# Patient Record
Sex: FEMALE | Race: WHITE
[De-identification: names, ages, dates, MRNs, and addresses within clinical notes are randomized per-mention and may not be internally consistent; named-entity substitution may affect disease eponyms.]

---

## 2018-02-16 ENCOUNTER — HOSPITAL ENCOUNTER (OUTPATIENT)
Dept: HOSPITAL 53 - M SDC | Age: 28
Discharge: HOME | End: 2018-02-16
Attending: OBSTETRICS & GYNECOLOGY
Payer: COMMERCIAL

## 2018-02-16 DIAGNOSIS — Z30.2: Primary | ICD-10-CM

## 2018-02-16 LAB
CONTROL LINE HCG: (no result)
HCG, SERUM QUALITATIVE: NEGATIVE
HEMATOCRIT: 39.1 % (ref 36–47)
HEMOGLOBIN: 12.4 G/DL (ref 12–16)
MEAN CORPUSCULAR HEMOGLOBIN: 27.7 PG (ref 27–33)
MEAN CORPUSCULAR HGB CONC: 31.7 G/DL (ref 32–36.5)
MEAN CORPUSCULAR VOLUME: 87.5 FL (ref 80–96)
NRBC BLD AUTO-RTO: 0 % (ref 0–0)
PLATELET COUNT, AUTOMATED: 342 10^3/UL (ref 150–450)
RED BLOOD COUNT: 4.47 10^6/UL (ref 4–5.4)
RED CELL DISTRIBUTION WIDTH: 13.3 % (ref 11.5–14.5)
WHITE BLOOD COUNT: 6 10^3/UL (ref 4–10)

## 2018-02-16 PROCEDURE — 58671 LAPAROSCOPY TUBAL BLOCK: CPT

## 2018-02-16 RX ADMIN — FENTANYL CITRATE 1 MCG: 50 INJECTION, SOLUTION INTRAMUSCULAR; INTRAVENOUS at 10:40

## 2018-02-16 RX ADMIN — FENTANYL CITRATE 1 MCG: 50 INJECTION, SOLUTION INTRAMUSCULAR; INTRAVENOUS at 10:30

## 2018-02-16 RX ADMIN — BUPIVACAINE HYDROCHLORIDE 1 ML: 2.5 INJECTION, SOLUTION EPIDURAL; INFILTRATION; INTRACAUDAL at 09:25

## 2018-02-16 RX ADMIN — FENTANYL CITRATE 1 MCG: 50 INJECTION, SOLUTION INTRAMUSCULAR; INTRAVENOUS at 10:45

## 2018-02-16 RX ADMIN — FENTANYL CITRATE 1 MCG: 50 INJECTION, SOLUTION INTRAMUSCULAR; INTRAVENOUS at 10:35

## 2018-02-16 RX ADMIN — HYDROCODONE BITARTRATE AND ACETAMINOPHEN 1 TAB: 5; 325 TABLET ORAL at 10:15

## 2018-02-16 RX ADMIN — SODIUM CHLORIDE, POTASSIUM CHLORIDE, SODIUM LACTATE AND CALCIUM CHLORIDE 1 MLS/HR: 600; 310; 30; 20 INJECTION, SOLUTION INTRAVENOUS at 08:20

## 2018-02-16 RX ADMIN — ONDANSETRON 1 MG: 2 INJECTION INTRAMUSCULAR; INTRAVENOUS at 10:15

## 2018-02-16 RX ADMIN — SODIUM CHLORIDE, POTASSIUM CHLORIDE, SODIUM LACTATE AND CALCIUM CHLORIDE 1 MLS/HR: 600; 310; 30; 20 INJECTION, SOLUTION INTRAVENOUS at 09:51

## 2018-02-16 RX ADMIN — KETOROLAC TROMETHAMINE 1 MG: 30 INJECTION, SOLUTION INTRAMUSCULAR at 10:15

## 2018-04-24 ENCOUNTER — HOSPITAL ENCOUNTER (EMERGENCY)
Dept: HOSPITAL 53 - M ED | Age: 28
Discharge: LEFT BEFORE DISCHARGE | End: 2018-04-24
Payer: MEDICAID

## 2018-04-24 DIAGNOSIS — Z53.21: Primary | ICD-10-CM

## 2018-09-17 ENCOUNTER — HOSPITAL ENCOUNTER (EMERGENCY)
Dept: HOSPITAL 53 - M ED | Age: 28
Discharge: HOME | End: 2018-09-17
Payer: COMMERCIAL

## 2018-09-17 DIAGNOSIS — Z91.040: ICD-10-CM

## 2018-09-17 DIAGNOSIS — F17.210: ICD-10-CM

## 2018-09-17 DIAGNOSIS — K04.7: Primary | ICD-10-CM

## 2018-09-17 DIAGNOSIS — F33.9: ICD-10-CM

## 2018-09-17 PROCEDURE — 99282 EMERGENCY DEPT VISIT SF MDM: CPT

## 2018-09-17 RX ADMIN — LIDOCAINE HYDROCHLORIDE 1 ML: 20 SOLUTION ORAL; TOPICAL at 11:23

## 2019-03-14 ENCOUNTER — HOSPITAL ENCOUNTER (OUTPATIENT)
Dept: HOSPITAL 53 - M LAB REF | Age: 29
End: 2019-03-14
Attending: OBSTETRICS & GYNECOLOGY
Payer: COMMERCIAL

## 2019-03-14 ENCOUNTER — HOSPITAL ENCOUNTER (OUTPATIENT)
Dept: HOSPITAL 53 - M SMT | Age: 29
End: 2019-03-14
Attending: OBSTETRICS & GYNECOLOGY
Payer: COMMERCIAL

## 2019-03-14 DIAGNOSIS — N92.0: Primary | ICD-10-CM

## 2019-03-14 DIAGNOSIS — R87.610: ICD-10-CM

## 2019-03-14 DIAGNOSIS — Z12.4: Primary | ICD-10-CM

## 2019-03-14 LAB
HCT VFR BLD AUTO: 39 % (ref 36–47)
HGB BLD-MCNC: 12.8 G/DL (ref 12–15.5)
MCH RBC QN AUTO: 29.8 PG (ref 27–33)
MCHC RBC AUTO-ENTMCNC: 32.8 G/DL (ref 32–36.5)
MCV RBC AUTO: 90.9 FL (ref 80–96)
PLATELET # BLD AUTO: 331 10^3/UL (ref 150–450)
RBC # BLD AUTO: 4.29 10^6/UL (ref 4–5.4)
T4 FREE SERPL-MCNC: 1.01 NG/DL (ref 0.76–1.46)
TSH SERPL DL<=0.005 MIU/L-ACNC: 0.63 UIU/ML (ref 0.36–3.74)
WBC # BLD AUTO: 5.8 10^3/UL (ref 4–10)

## 2019-03-19 ENCOUNTER — HOSPITAL ENCOUNTER (OUTPATIENT)
Dept: HOSPITAL 53 - M RAD | Age: 29
End: 2019-03-19
Attending: OBSTETRICS & GYNECOLOGY
Payer: COMMERCIAL

## 2019-03-19 DIAGNOSIS — N92.0: Primary | ICD-10-CM

## 2019-03-19 LAB — HPV LOW VOL RFLX: (no result)

## 2019-03-20 NOTE — REP
Clinical:  Abnormal menstruation.

 

Technique:  Transabdominal pelvic ultrasound.

 

Findings:

 

Bladder is unremarkable and measures 9.9 x 9.4 x 6.3 cm .

 

Normal anteverted uterus measures 10.0 x 3.5 x 5.4 cm .  The endometrial complex

measures as 2.7 mm thickness.  No discrete uterine or endometrial abnormalities

are appreciated.

 

Bilateral ovaries are normal in appearance.  Right ovary measures 3.7 x 1.8 x 2.9

cm.  Left ovary measures 3.3 x 1.8 x 1.9 cm.

 

No pelvic fluid or adnexal mass lesion .

 

Impression:

1.  normal pelvic ultrasound.

 

 

Electronically Signed by

Marck Royal MD 03/20/2019 05:38 A

## 2019-03-29 ENCOUNTER — HOSPITAL ENCOUNTER (OUTPATIENT)
Dept: HOSPITAL 53 - M SDC | Age: 29
LOS: 1 days | Discharge: HOME | End: 2019-03-30
Attending: OBSTETRICS & GYNECOLOGY
Payer: COMMERCIAL

## 2019-03-29 VITALS — SYSTOLIC BLOOD PRESSURE: 122 MMHG | DIASTOLIC BLOOD PRESSURE: 62 MMHG

## 2019-03-29 VITALS — WEIGHT: 118 LBS | HEIGHT: 65 IN | BODY MASS INDEX: 19.66 KG/M2

## 2019-03-29 VITALS — SYSTOLIC BLOOD PRESSURE: 106 MMHG | DIASTOLIC BLOOD PRESSURE: 55 MMHG

## 2019-03-29 VITALS — SYSTOLIC BLOOD PRESSURE: 116 MMHG | DIASTOLIC BLOOD PRESSURE: 75 MMHG

## 2019-03-29 VITALS — SYSTOLIC BLOOD PRESSURE: 108 MMHG | DIASTOLIC BLOOD PRESSURE: 58 MMHG

## 2019-03-29 VITALS — DIASTOLIC BLOOD PRESSURE: 55 MMHG | SYSTOLIC BLOOD PRESSURE: 106 MMHG

## 2019-03-29 VITALS — SYSTOLIC BLOOD PRESSURE: 106 MMHG | DIASTOLIC BLOOD PRESSURE: 51 MMHG

## 2019-03-29 VITALS — DIASTOLIC BLOOD PRESSURE: 51 MMHG | SYSTOLIC BLOOD PRESSURE: 102 MMHG

## 2019-03-29 VITALS — DIASTOLIC BLOOD PRESSURE: 50 MMHG | SYSTOLIC BLOOD PRESSURE: 100 MMHG

## 2019-03-29 DIAGNOSIS — F17.210: ICD-10-CM

## 2019-03-29 DIAGNOSIS — N94.6: ICD-10-CM

## 2019-03-29 DIAGNOSIS — F32.9: ICD-10-CM

## 2019-03-29 DIAGNOSIS — D64.9: ICD-10-CM

## 2019-03-29 DIAGNOSIS — N92.6: Primary | ICD-10-CM

## 2019-03-29 DIAGNOSIS — Z91.040: ICD-10-CM

## 2019-03-29 DIAGNOSIS — N72: ICD-10-CM

## 2019-03-29 DIAGNOSIS — F41.9: ICD-10-CM

## 2019-03-29 LAB
B-HCG SERPL QL: NEGATIVE
HCT VFR BLD AUTO: 37.5 % (ref 36–47)
HGB BLD-MCNC: 12.3 G/DL (ref 12–15.5)
MCH RBC QN AUTO: 29.6 PG (ref 27–33)
MCHC RBC AUTO-ENTMCNC: 32.8 G/DL (ref 32–36.5)
MCV RBC AUTO: 90.4 FL (ref 80–96)
PLATELET # BLD AUTO: 276 10^3/UL (ref 150–450)
RBC # BLD AUTO: 4.15 10^6/UL (ref 4–5.4)
WBC # BLD AUTO: 8.5 10^3/UL (ref 4–10)

## 2019-03-29 PROCEDURE — 86900 BLOOD TYPING SEROLOGIC ABO: CPT

## 2019-03-29 PROCEDURE — 88307 TISSUE EXAM BY PATHOLOGIST: CPT

## 2019-03-29 PROCEDURE — 84703 CHORIONIC GONADOTROPIN ASSAY: CPT

## 2019-03-29 PROCEDURE — 86850 RBC ANTIBODY SCREEN: CPT

## 2019-03-29 PROCEDURE — 36415 COLL VENOUS BLD VENIPUNCTURE: CPT

## 2019-03-29 PROCEDURE — 85027 COMPLETE CBC AUTOMATED: CPT

## 2019-03-29 PROCEDURE — 58571 TLH W/T/O 250 G OR LESS: CPT

## 2019-03-29 PROCEDURE — 86901 BLOOD TYPING SEROLOGIC RH(D): CPT

## 2019-03-29 RX ADMIN — FENTANYL CITRATE PRN MCG: 50 INJECTION, SOLUTION INTRAMUSCULAR; INTRAVENOUS at 12:12

## 2019-03-29 RX ADMIN — KETOROLAC TROMETHAMINE SCH MG: 30 INJECTION, SOLUTION INTRAMUSCULAR at 22:10

## 2019-03-29 RX ADMIN — KETOROLAC TROMETHAMINE SCH MG: 30 INJECTION, SOLUTION INTRAMUSCULAR at 17:07

## 2019-03-29 RX ADMIN — FENTANYL CITRATE PRN MCG: 50 INJECTION, SOLUTION INTRAMUSCULAR; INTRAVENOUS at 11:55

## 2019-03-29 RX ADMIN — FENTANYL CITRATE PRN MCG: 50 INJECTION, SOLUTION INTRAMUSCULAR; INTRAVENOUS at 12:18

## 2019-03-29 RX ADMIN — FENTANYL CITRATE PRN MCG: 50 INJECTION, SOLUTION INTRAMUSCULAR; INTRAVENOUS at 12:06

## 2019-03-29 NOTE — RO
DATE OF PROCEDURE:  03/29/2019

 

PREOPERATIVE DIAGNOSIS:  Abnormal uterine bleeding.

 

POSTOPERATIVE DIAGNOSIS:  Abnormal uterine bleeding.

 

PROCEDURE PERFORMED:

1.  Robotic-assisted laparoscopic hysterectomy.

2.  Bilateral salpingectomy.

3.  Cystoscopy.

 

SURGEON:  Madonna Cortes MD

 

ASSISTANT:  Jackie Kilgore NP

 

ANESTHESIA:  General endotracheal anesthesia.

 

ESTIMATED BLOOD LOSS:  100 mL.

 

IV FLUIDS:  500 mL of lactated Ringer solution.

 

URINE OUTPUT:  100 mL.

 

SPECIMENS:  Uterus, cervix, bilateral fallopian tubes with bilateral Filshie

clips.

 

PREOPERATIVE ANTIBIOTICS:  2 grams of Ancef.

 

INFECTION CLASSIFICATION:  II.

 

DESCRIPTION OF OPERATION:  After informed consent was obtained and written

consent was reviewed, the patient was brought to the operating room where she was

placed under general endotracheal anesthesia.  She was then placed in lithotomy

position and was prepped and draped in normal sterile fashion.  A time out in the

operating room was then performed identifying the patient, the procedure to be

performed, as well as drug allergies.  A bivalved speculum was then placed

revealing the cervix.  The anterior lip of the cervix was grasped with a single

tooth tenaculum.  The anterior and posterior aspects of the cervix were stitched

with #0 Vicryl.  The uterus then sounded to 9 cm.  An extra large VCare uterine

manipulator was then advanced to the cervical os for means to manipulate the

uterus.  The intrauterine balloon was insufflated with 7 mL of air.  The vaginal

cap was then placed over the cervix and the vaginal sleeve was then placed into

the vagina.  The single tooth tenaculum was removed.  The Grigsby catheter was then

placed and set to gravity.  Gloves were changed and attention was then turned to

the patient's abdomen where  a Veress needle was placed into the umbilicus.  A

pneumoperitoneum was then obtained with CO2 gas.  0.25% Marcaine was then infused

in the supraumbilical area.  This area was incised and an 8 mm trocar and sleeve

was advanced through this incision.  Laparoscope was then replaced revealing

intra-abdominal placement.  Three additional port sites were placed on either

side of the umbilicus.  On the right side, there was one incision that was made

after infusing 0.25% Marcaine.  Incision was made in this area and an 8 mm trocar

and sleeve was advanced through this incision under direct visualization.  On the

patient's left side, two incisions were made approximately 6 cm apart from one

another.  These areas were infused with 0.25% Marcaine.  Incision was made in

each one of these areas and 8 mm trocars and sleeves advanced through each one of

these incisions under direct visualization.  Next, the da Gilda was then docked

utilizing two operative arms and one camera.  Next, bilateral salpingectomies

were then performed using a vessel sealer.  The right fallopian tube was placed

on traction.  It was dissected along the mesosalpinx and was transected at the

level of the uterus.  In a similar fashion, the left fallopian tube was placed on

traction and using a vessel sealer was transected along the mesosalpinx and was

transected at the level of the uterus.  These specimens were then removed from

the patient's abdomen.  Next, the ovarian ligaments bilaterally were cauterized

and ligated with good hemostasis noted.  The round ligaments bilaterally were

cauterized and ligated with good hemostasis noted.  The broad ligaments were then

 and dissected anteriorly and was then dissected along the bladder

creating a bladder flap.  The remainder of the broad and cardinal ligament was

then dissected and cauterized with good hemostasis noted.  The uterine vessels

were then skeletonized bilaterally and were cauterized and ligated with good

hemostasis noted.  Next, anterior and posterior colpotomies were then made with

the Endo wallace monopolar.  The specimens then removed vaginally.  The vaginal

cuff was then closed using an #0 V-Loc in a running fashion.  The surgical sites

were irrigated and suctioned.  The surgical sites were noted to be hemostatic.

Elvis was applied over the surgical field.  The pneumoperitoneum was released.

Next, cystoscopy was then performed, Grigsby catheter was removed.  A 70 degrees

cystoscope was advanced transurethrally and the bladder was inspected showing

normal bladder mucosa with no foreign bodies.  Bilateral ureter jets were

observed.  The bladder was then drained and the cystoscope was removed.  Gloves

were changed.  Attention was turned to the patient's abdomen where all four skin

incisions were closed with #4-0 Monocryl and dressed with Dermabond.  The patient

was then taken out lithotomy position, was awakened from general anesthesia and

taken to recovery in stable condition.  Counts were correct.

 

Jackie Kilgore, my surgical assistant, played an essential role during surgery.  She

assisted with port placement, tissue retraction and identification, as well as,

wound closure.

## 2019-03-30 VITALS — SYSTOLIC BLOOD PRESSURE: 105 MMHG | DIASTOLIC BLOOD PRESSURE: 54 MMHG

## 2019-03-30 VITALS — SYSTOLIC BLOOD PRESSURE: 114 MMHG | DIASTOLIC BLOOD PRESSURE: 53 MMHG

## 2019-03-30 VITALS — DIASTOLIC BLOOD PRESSURE: 58 MMHG | SYSTOLIC BLOOD PRESSURE: 103 MMHG

## 2019-03-30 LAB
HCT VFR BLD AUTO: 31.4 % (ref 36–47)
HGB BLD-MCNC: 10.4 G/DL (ref 12–15.5)
MCH RBC QN AUTO: 29.3 PG (ref 27–33)
MCHC RBC AUTO-ENTMCNC: 33.1 G/DL (ref 32–36.5)
MCV RBC AUTO: 88.5 FL (ref 80–96)
PLATELET # BLD AUTO: 269 10^3/UL (ref 150–450)
RBC # BLD AUTO: 3.55 10^6/UL (ref 4–5.4)
WBC # BLD AUTO: 13 10^3/UL (ref 4–10)

## 2019-03-30 RX ADMIN — KETOROLAC TROMETHAMINE SCH MG: 30 INJECTION, SOLUTION INTRAMUSCULAR at 10:28

## 2019-03-30 RX ADMIN — KETOROLAC TROMETHAMINE SCH MG: 30 INJECTION, SOLUTION INTRAMUSCULAR at 04:43

## 2020-04-21 ENCOUNTER — HOSPITAL ENCOUNTER (OUTPATIENT)
Dept: HOSPITAL 53 - M LRY | Age: 30
End: 2020-04-21
Attending: PHYSICIAN ASSISTANT
Payer: COMMERCIAL
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
## 2020-04-21 DIAGNOSIS — S69.91XA: Primary | ICD-10-CM
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
## 2020-04-21 NOTE — REP
REASON:  Pain after trauma.

 

FINDINGS:  No acute fracture or destructive osseous lesion.

 

 

Electronically Signed by

Leoncio Geller DO 04/21/2020 04:50 P

## 2020-10-12 ENCOUNTER — HOSPITAL ENCOUNTER (OUTPATIENT)
Dept: HOSPITAL 53 - M WHC | Age: 30
End: 2020-10-12
Attending: OBSTETRICS & GYNECOLOGY
Payer: COMMERCIAL

## 2020-10-12 DIAGNOSIS — Z53.9: Primary | ICD-10-CM

## 2020-10-12 DIAGNOSIS — N83.201: ICD-10-CM

## 2020-10-16 ENCOUNTER — HOSPITAL ENCOUNTER (OUTPATIENT)
Dept: HOSPITAL 53 - M WHC | Age: 30
End: 2020-10-16
Attending: OBSTETRICS & GYNECOLOGY
Payer: COMMERCIAL

## 2020-10-16 DIAGNOSIS — N83.201: Primary | ICD-10-CM

## 2020-10-16 NOTE — REP
INDICATION:

N83.201 RT OVARIAN CYST



COMPARISON:

03/19/2019



TECHNIQUE:

Transabdominal pelvic ultrasound with color Doppler evaluation of the ovaries.



FINDINGS:

Bladder is unremarkable and measures 9.2 x 7.4 x 10.3 cm.



Patient gives a history of prior hysterectomy but images suggest the possibility of

partial hysterectomy with at least residual cervical and lower uterine segment

remnant.  No significant pelvic fluid or mass lesion is appreciated.



Bilateral ovaries are normal in appearance and vascularity without evidence for

torsion.  Right ovary measures 2.9 x 1.7 x 3.0 cm with 1.3 cm presumed physiologic

cyst/dominant follicle; R I = normal blood flow noted.  Left ovary measures 2.7 x 1.8

x 2.8 cm; R I = normal blood flow noted.



IMPRESSION:

1. Findings most compatible.

2. Essentially normal appearance of the ovaries with 1.3 cm presumed right ovarian

follicle/physiologic cyst.



<Electronically signed by Marck Royal > 10/16/20 [id number]